# Patient Record
Sex: MALE | Race: WHITE | NOT HISPANIC OR LATINO | Employment: FULL TIME | URBAN - METROPOLITAN AREA
[De-identification: names, ages, dates, MRNs, and addresses within clinical notes are randomized per-mention and may not be internally consistent; named-entity substitution may affect disease eponyms.]

---

## 2019-04-01 ENCOUNTER — APPOINTMENT (OUTPATIENT)
Dept: LAB | Facility: HOSPITAL | Age: 59
End: 2019-04-01
Attending: SURGERY
Payer: COMMERCIAL

## 2019-04-01 ENCOUNTER — CONSULT (OUTPATIENT)
Dept: SURGERY | Facility: HOSPITAL | Age: 59
End: 2019-04-01
Payer: COMMERCIAL

## 2019-04-01 VITALS
HEART RATE: 98 BPM | WEIGHT: 275.2 LBS | HEIGHT: 72 IN | DIASTOLIC BLOOD PRESSURE: 97 MMHG | SYSTOLIC BLOOD PRESSURE: 138 MMHG | BODY MASS INDEX: 37.27 KG/M2 | TEMPERATURE: 99.4 F | RESPIRATION RATE: 16 BRPM

## 2019-04-01 DIAGNOSIS — D18.01 HEMANGIOMA OF SKIN: ICD-10-CM

## 2019-04-01 DIAGNOSIS — K42.9 UMBILICAL HERNIA WITHOUT OBSTRUCTION AND WITHOUT GANGRENE: ICD-10-CM

## 2019-04-01 DIAGNOSIS — R10.32 GROIN PAIN, LEFT: Primary | ICD-10-CM

## 2019-04-01 DIAGNOSIS — L21.9 SEBORRHEA: ICD-10-CM

## 2019-04-01 DIAGNOSIS — R10.32 GROIN PAIN, LEFT: ICD-10-CM

## 2019-04-01 DIAGNOSIS — D22.9 MULTIPLE PIGMENTED NEVI: ICD-10-CM

## 2019-04-01 DIAGNOSIS — E66.9 OBESITY (BMI 30-39.9): ICD-10-CM

## 2019-04-01 DIAGNOSIS — M62.08 RECTUS DIASTASIS: ICD-10-CM

## 2019-04-01 LAB
ANION GAP SERPL CALCULATED.3IONS-SCNC: 10 MMOL/L (ref 4–13)
BUN SERPL-MCNC: 10 MG/DL (ref 5–25)
CALCIUM SERPL-MCNC: 9.8 MG/DL (ref 8.3–10.1)
CHLORIDE SERPL-SCNC: 99 MMOL/L (ref 100–108)
CO2 SERPL-SCNC: 29 MMOL/L (ref 21–32)
CREAT SERPL-MCNC: 0.8 MG/DL (ref 0.6–1.3)
GFR SERPL CREATININE-BSD FRML MDRD: 99 ML/MIN/1.73SQ M
GLUCOSE SERPL-MCNC: 327 MG/DL (ref 65–140)
POTASSIUM SERPL-SCNC: 4 MMOL/L (ref 3.5–5.3)
SODIUM SERPL-SCNC: 138 MMOL/L (ref 136–145)

## 2019-04-01 PROCEDURE — 36415 COLL VENOUS BLD VENIPUNCTURE: CPT

## 2019-04-01 PROCEDURE — 99204 OFFICE O/P NEW MOD 45 MIN: CPT | Performed by: SURGERY

## 2019-04-01 PROCEDURE — 80048 BASIC METABOLIC PNL TOTAL CA: CPT

## 2019-04-01 RX ORDER — TRAVOPROST 0.004 %
DROPS OPHTHALMIC (EYE)
Refills: 4 | COMMUNITY
Start: 2019-01-07

## 2019-04-01 RX ORDER — VALSARTAN 160 MG/1
160 TABLET ORAL DAILY
Refills: 3 | COMMUNITY
Start: 2019-01-25

## 2019-04-06 ENCOUNTER — HOSPITAL ENCOUNTER (OUTPATIENT)
Dept: CT IMAGING | Facility: HOSPITAL | Age: 59
Discharge: HOME | End: 2019-04-06
Attending: SURGERY
Payer: COMMERCIAL

## 2019-04-06 DIAGNOSIS — R10.32 GROIN PAIN, LEFT: ICD-10-CM

## 2019-04-06 PROCEDURE — 72193 CT PELVIS W/DYE: CPT

## 2019-04-06 RX ADMIN — IOHEXOL 100 ML: 350 INJECTION, SOLUTION INTRAVENOUS at 07:54

## 2019-05-06 ENCOUNTER — OFFICE VISIT (OUTPATIENT)
Dept: SURGERY | Facility: HOSPITAL | Age: 59
End: 2019-05-06
Payer: COMMERCIAL

## 2019-05-06 ENCOUNTER — HOSPITAL ENCOUNTER (OUTPATIENT)
Dept: NON INVASIVE DIAGNOSTICS | Facility: HOSPITAL | Age: 59
Discharge: HOME | End: 2019-05-06
Attending: SURGERY
Payer: COMMERCIAL

## 2019-05-06 ENCOUNTER — LAB (OUTPATIENT)
Dept: LAB | Facility: HOSPITAL | Age: 59
End: 2019-05-06
Attending: SURGERY
Payer: COMMERCIAL

## 2019-05-06 ENCOUNTER — HOSPITAL ENCOUNTER (OUTPATIENT)
Dept: RADIOLOGY | Facility: HOSPITAL | Age: 59
Discharge: HOME | End: 2019-05-06
Attending: SURGERY
Payer: COMMERCIAL

## 2019-05-06 VITALS
HEART RATE: 92 BPM | SYSTOLIC BLOOD PRESSURE: 167 MMHG | HEIGHT: 72 IN | WEIGHT: 276.4 LBS | BODY MASS INDEX: 37.44 KG/M2 | TEMPERATURE: 99.1 F | RESPIRATION RATE: 16 BRPM | DIASTOLIC BLOOD PRESSURE: 113 MMHG

## 2019-05-06 DIAGNOSIS — K40.90 NON-RECURRENT UNILATERAL INGUINAL HERNIA WITHOUT OBSTRUCTION OR GANGRENE: Primary | ICD-10-CM

## 2019-05-06 DIAGNOSIS — K57.90 DIVERTICULOSIS: ICD-10-CM

## 2019-05-06 DIAGNOSIS — K40.90 NON-RECURRENT UNILATERAL INGUINAL HERNIA WITHOUT OBSTRUCTION OR GANGRENE: ICD-10-CM

## 2019-05-06 DIAGNOSIS — E66.01 OBESITIES, MORBID (HCC): ICD-10-CM

## 2019-05-06 DIAGNOSIS — K42.9 UMBILICAL HERNIA WITHOUT OBSTRUCTION OR GANGRENE: ICD-10-CM

## 2019-05-06 LAB
ANION GAP SERPL CALCULATED.3IONS-SCNC: 10 MMOL/L (ref 4–13)
ATRIAL RATE: 78 BPM
BASOPHILS # BLD AUTO: 0.03 THOUSANDS/ΜL (ref 0–0.1)
BASOPHILS NFR BLD AUTO: 0 % (ref 0–1)
BILIRUB UR QL STRIP: NEGATIVE
BUN SERPL-MCNC: 13 MG/DL (ref 5–25)
CALCIUM SERPL-MCNC: 9.3 MG/DL (ref 8.3–10.1)
CHLORIDE SERPL-SCNC: 100 MMOL/L (ref 100–108)
CLARITY UR: CLEAR
CO2 SERPL-SCNC: 27 MMOL/L (ref 21–32)
COLOR UR: YELLOW
CREAT SERPL-MCNC: 0.75 MG/DL (ref 0.6–1.3)
EOSINOPHIL # BLD AUTO: 0.26 THOUSAND/ΜL (ref 0–0.61)
EOSINOPHIL NFR BLD AUTO: 3 % (ref 0–6)
ERYTHROCYTE [DISTWIDTH] IN BLOOD BY AUTOMATED COUNT: 12.1 % (ref 11.6–15.1)
EST. AVERAGE GLUCOSE BLD GHB EST-MCNC: 209 MG/DL
GFR SERPL CREATININE-BSD FRML MDRD: 102 ML/MIN/1.73SQ M
GLUCOSE P FAST SERPL-MCNC: 230 MG/DL (ref 65–99)
GLUCOSE UR STRIP-MCNC: ABNORMAL MG/DL
HBA1C MFR BLD: 8.9 % (ref 4.2–6.3)
HCT VFR BLD AUTO: 43 % (ref 36.5–49.3)
HGB BLD-MCNC: 14.3 G/DL (ref 12–17)
HGB UR QL STRIP.AUTO: NEGATIVE
IMM GRANULOCYTES # BLD AUTO: 0.02 THOUSAND/UL (ref 0–0.2)
IMM GRANULOCYTES NFR BLD AUTO: 0 % (ref 0–2)
KETONES UR STRIP-MCNC: ABNORMAL MG/DL
LEUKOCYTE ESTERASE UR QL STRIP: NEGATIVE
LYMPHOCYTES # BLD AUTO: 3.07 THOUSANDS/ΜL (ref 0.6–4.47)
LYMPHOCYTES NFR BLD AUTO: 38 % (ref 14–44)
MCH RBC QN AUTO: 30.6 PG (ref 26.8–34.3)
MCHC RBC AUTO-ENTMCNC: 33.3 G/DL (ref 31.4–37.4)
MCV RBC AUTO: 92 FL (ref 82–98)
MONOCYTES # BLD AUTO: 0.66 THOUSAND/ΜL (ref 0.17–1.22)
MONOCYTES NFR BLD AUTO: 8 % (ref 4–12)
NEUTROPHILS # BLD AUTO: 3.99 THOUSANDS/ΜL (ref 1.85–7.62)
NEUTS SEG NFR BLD AUTO: 51 % (ref 43–75)
NITRITE UR QL STRIP: NEGATIVE
NRBC BLD AUTO-RTO: 0 /100 WBCS
P AXIS: 43 DEGREES
PH UR STRIP.AUTO: 5.5 [PH]
PLATELET # BLD AUTO: 225 THOUSANDS/UL (ref 149–390)
PMV BLD AUTO: 11.1 FL (ref 8.9–12.7)
POTASSIUM SERPL-SCNC: 4.4 MMOL/L (ref 3.5–5.3)
PR INTERVAL: 148 MS
PROT UR STRIP-MCNC: NEGATIVE MG/DL
QRS AXIS: 2 DEGREES
QRSD INTERVAL: 72 MS
QT INTERVAL: 382 MS
QTC INTERVAL: 435 MS
RBC # BLD AUTO: 4.67 MILLION/UL (ref 3.88–5.62)
SODIUM SERPL-SCNC: 137 MMOL/L (ref 136–145)
SP GR UR STRIP.AUTO: >=1.03 (ref 1–1.03)
T WAVE AXIS: 17 DEGREES
UROBILINOGEN UR QL STRIP.AUTO: 0.2 E.U./DL
VENTRICULAR RATE: 78 BPM
WBC # BLD AUTO: 8.03 THOUSAND/UL (ref 4.31–10.16)

## 2019-05-06 PROCEDURE — 83036 HEMOGLOBIN GLYCOSYLATED A1C: CPT

## 2019-05-06 PROCEDURE — 81003 URINALYSIS AUTO W/O SCOPE: CPT | Performed by: SURGERY

## 2019-05-06 PROCEDURE — 93010 ELECTROCARDIOGRAM REPORT: CPT | Performed by: INTERNAL MEDICINE

## 2019-05-06 PROCEDURE — 36415 COLL VENOUS BLD VENIPUNCTURE: CPT

## 2019-05-06 PROCEDURE — 71046 X-RAY EXAM CHEST 2 VIEWS: CPT

## 2019-05-06 PROCEDURE — 85025 COMPLETE CBC W/AUTO DIFF WBC: CPT

## 2019-05-06 PROCEDURE — 80048 BASIC METABOLIC PNL TOTAL CA: CPT

## 2019-05-06 PROCEDURE — 93005 ELECTROCARDIOGRAM TRACING: CPT

## 2019-05-06 PROCEDURE — 99214 OFFICE O/P EST MOD 30 MIN: CPT | Performed by: SURGERY

## 2019-05-12 RX ORDER — CEFAZOLIN SODIUM 2 G/50ML
2000 SOLUTION INTRAVENOUS ONCE
Status: CANCELLED | OUTPATIENT
Start: 2019-05-21 | End: 2019-05-21

## 2019-05-12 RX ORDER — SODIUM CHLORIDE, SODIUM LACTATE, POTASSIUM CHLORIDE, CALCIUM CHLORIDE 600; 310; 30; 20 MG/100ML; MG/100ML; MG/100ML; MG/100ML
125 INJECTION, SOLUTION INTRAVENOUS CONTINUOUS
Status: CANCELLED | OUTPATIENT
Start: 2019-05-21

## 2019-05-21 ENCOUNTER — HOSPITAL ENCOUNTER (OUTPATIENT)
Facility: HOSPITAL | Age: 59
Setting detail: OUTPATIENT SURGERY
Discharge: HOME | End: 2019-05-21
Attending: SURGERY | Admitting: SURGERY
Payer: COMMERCIAL

## 2019-05-21 ENCOUNTER — ANESTHESIA EVENT (OUTPATIENT)
Dept: PERIOP | Facility: HOSPITAL | Age: 59
End: 2019-05-21
Payer: COMMERCIAL

## 2019-05-21 ENCOUNTER — ANESTHESIA (OUTPATIENT)
Dept: PERIOP | Facility: HOSPITAL | Age: 59
End: 2019-05-21
Payer: COMMERCIAL

## 2019-05-21 VITALS
HEIGHT: 72 IN | OXYGEN SATURATION: 93 % | WEIGHT: 270.2 LBS | SYSTOLIC BLOOD PRESSURE: 139 MMHG | TEMPERATURE: 97.8 F | BODY MASS INDEX: 36.6 KG/M2 | DIASTOLIC BLOOD PRESSURE: 79 MMHG | HEART RATE: 97 BPM | RESPIRATION RATE: 16 BRPM

## 2019-05-21 DIAGNOSIS — Z98.890 S/P HERNIA REPAIR: Primary | ICD-10-CM

## 2019-05-21 DIAGNOSIS — Z87.19 S/P HERNIA REPAIR: Primary | ICD-10-CM

## 2019-05-21 DIAGNOSIS — K40.90 NON-RECURRENT UNILATERAL INGUINAL HERNIA WITHOUT OBSTRUCTION OR GANGRENE: ICD-10-CM

## 2019-05-21 PROBLEM — K42.9 UMBILICAL HERNIA WITHOUT OBSTRUCTION OR GANGRENE: Status: RESOLVED | Noted: 2019-05-06 | Resolved: 2019-05-21

## 2019-05-21 LAB
GLUCOSE SERPL-MCNC: 151 MG/DL (ref 65–140)
GLUCOSE SERPL-MCNC: 158 MG/DL (ref 65–140)

## 2019-05-21 PROCEDURE — 49650 LAP ING HERNIA REPAIR INIT: CPT | Performed by: SURGERY

## 2019-05-21 PROCEDURE — C1781 MESH (IMPLANTABLE): HCPCS | Performed by: SURGERY

## 2019-05-21 PROCEDURE — 82948 REAGENT STRIP/BLOOD GLUCOSE: CPT

## 2019-05-21 DEVICE — BARD 3DMAX MESH LEFT LARGE
Type: IMPLANTABLE DEVICE | Status: FUNCTIONAL
Brand: BARD 3DMAX MESH

## 2019-05-21 DEVICE — VENTRALEX ST HERNIA PATCH
Type: IMPLANTABLE DEVICE | Site: ABDOMEN | Status: FUNCTIONAL
Brand: VENTRALEX ST HERNIA PATCH

## 2019-05-21 RX ORDER — GLYCOPYRROLATE 0.2 MG/ML
INJECTION INTRAMUSCULAR; INTRAVENOUS AS NEEDED
Status: DISCONTINUED | OUTPATIENT
Start: 2019-05-21 | End: 2019-05-21 | Stop reason: SURG

## 2019-05-21 RX ORDER — CEFAZOLIN SODIUM 2 G/50ML
2000 SOLUTION INTRAVENOUS ONCE
Status: COMPLETED | OUTPATIENT
Start: 2019-05-21 | End: 2019-05-21

## 2019-05-21 RX ORDER — HYDROCODONE BITARTRATE AND ACETAMINOPHEN 5; 325 MG/1; MG/1
1-2 TABLET ORAL EVERY 4 HOURS PRN
Qty: 30 TABLET | Refills: 0 | Status: SHIPPED | OUTPATIENT
Start: 2019-05-21 | End: 2019-05-24

## 2019-05-21 RX ORDER — TAMSULOSIN HYDROCHLORIDE 0.4 MG/1
0.4 CAPSULE ORAL ONCE AS NEEDED
Status: DISCONTINUED | OUTPATIENT
Start: 2019-05-21 | End: 2019-05-21 | Stop reason: HOSPADM

## 2019-05-21 RX ORDER — ONDANSETRON 2 MG/ML
INJECTION INTRAMUSCULAR; INTRAVENOUS AS NEEDED
Status: DISCONTINUED | OUTPATIENT
Start: 2019-05-21 | End: 2019-05-21 | Stop reason: SURG

## 2019-05-21 RX ORDER — MAGNESIUM HYDROXIDE 1200 MG/15ML
LIQUID ORAL AS NEEDED
Status: DISCONTINUED | OUTPATIENT
Start: 2019-05-21 | End: 2019-05-21 | Stop reason: HOSPADM

## 2019-05-21 RX ORDER — PROPOFOL 10 MG/ML
INJECTION, EMULSION INTRAVENOUS AS NEEDED
Status: DISCONTINUED | OUTPATIENT
Start: 2019-05-21 | End: 2019-05-21 | Stop reason: SURG

## 2019-05-21 RX ORDER — HYDROCODONE BITARTRATE AND ACETAMINOPHEN 5; 325 MG/1; MG/1
2 TABLET ORAL EVERY 4 HOURS PRN
Status: DISCONTINUED | OUTPATIENT
Start: 2019-05-21 | End: 2019-05-21 | Stop reason: HOSPADM

## 2019-05-21 RX ORDER — FENTANYL CITRATE/PF 50 MCG/ML
25 SYRINGE (ML) INJECTION
Status: DISCONTINUED | OUTPATIENT
Start: 2019-05-21 | End: 2019-05-21 | Stop reason: HOSPADM

## 2019-05-21 RX ORDER — ONDANSETRON 2 MG/ML
4 INJECTION INTRAMUSCULAR; INTRAVENOUS EVERY 6 HOURS PRN
Status: DISCONTINUED | OUTPATIENT
Start: 2019-05-21 | End: 2019-05-21 | Stop reason: HOSPADM

## 2019-05-21 RX ORDER — CEFAZOLIN SODIUM 1 G/50ML
SOLUTION INTRAVENOUS AS NEEDED
Status: DISCONTINUED | OUTPATIENT
Start: 2019-05-21 | End: 2019-05-21 | Stop reason: SURG

## 2019-05-21 RX ORDER — GLIPIZIDE 5 MG/1
5 TABLET ORAL
COMMUNITY

## 2019-05-21 RX ORDER — HYDROCODONE BITARTRATE AND ACETAMINOPHEN 5; 325 MG/1; MG/1
1-2 TABLET ORAL EVERY 4 HOURS PRN
Qty: 30 TABLET | Refills: 0 | Status: SHIPPED | OUTPATIENT
Start: 2019-05-21 | End: 2019-05-21 | Stop reason: SDUPTHER

## 2019-05-21 RX ORDER — EPHEDRINE SULFATE 50 MG/ML
INJECTION INTRAVENOUS AS NEEDED
Status: DISCONTINUED | OUTPATIENT
Start: 2019-05-21 | End: 2019-05-21 | Stop reason: SURG

## 2019-05-21 RX ORDER — ACETAMINOPHEN 325 MG/1
650 TABLET ORAL EVERY 6 HOURS PRN
Status: DISCONTINUED | OUTPATIENT
Start: 2019-05-21 | End: 2019-05-21 | Stop reason: HOSPADM

## 2019-05-21 RX ORDER — NEOSTIGMINE METHYLSULFATE 1 MG/ML
INJECTION INTRAVENOUS AS NEEDED
Status: DISCONTINUED | OUTPATIENT
Start: 2019-05-21 | End: 2019-05-21 | Stop reason: SURG

## 2019-05-21 RX ORDER — KETOROLAC TROMETHAMINE 30 MG/ML
INJECTION, SOLUTION INTRAMUSCULAR; INTRAVENOUS AS NEEDED
Status: DISCONTINUED | OUTPATIENT
Start: 2019-05-21 | End: 2019-05-21 | Stop reason: SURG

## 2019-05-21 RX ORDER — HYDROMORPHONE HCL/PF 1 MG/ML
0.5 SYRINGE (ML) INJECTION
Status: DISCONTINUED | OUTPATIENT
Start: 2019-05-21 | End: 2019-05-21 | Stop reason: HOSPADM

## 2019-05-21 RX ORDER — FENTANYL CITRATE 50 UG/ML
INJECTION, SOLUTION INTRAMUSCULAR; INTRAVENOUS AS NEEDED
Status: DISCONTINUED | OUTPATIENT
Start: 2019-05-21 | End: 2019-05-21 | Stop reason: SURG

## 2019-05-21 RX ORDER — MIDAZOLAM HYDROCHLORIDE 1 MG/ML
INJECTION INTRAMUSCULAR; INTRAVENOUS AS NEEDED
Status: DISCONTINUED | OUTPATIENT
Start: 2019-05-21 | End: 2019-05-21 | Stop reason: SURG

## 2019-05-21 RX ORDER — ROCURONIUM BROMIDE 10 MG/ML
INJECTION, SOLUTION INTRAVENOUS AS NEEDED
Status: DISCONTINUED | OUTPATIENT
Start: 2019-05-21 | End: 2019-05-21 | Stop reason: SURG

## 2019-05-21 RX ORDER — SODIUM CHLORIDE, SODIUM LACTATE, POTASSIUM CHLORIDE, CALCIUM CHLORIDE 600; 310; 30; 20 MG/100ML; MG/100ML; MG/100ML; MG/100ML
125 INJECTION, SOLUTION INTRAVENOUS CONTINUOUS
Status: DISCONTINUED | OUTPATIENT
Start: 2019-05-21 | End: 2019-05-21 | Stop reason: HOSPADM

## 2019-05-21 RX ADMIN — FENTANYL CITRATE 50 MCG: 50 INJECTION, SOLUTION INTRAMUSCULAR; INTRAVENOUS at 12:04

## 2019-05-21 RX ADMIN — SODIUM CHLORIDE, SODIUM LACTATE, POTASSIUM CHLORIDE, AND CALCIUM CHLORIDE: .6; .31; .03; .02 INJECTION, SOLUTION INTRAVENOUS at 11:09

## 2019-05-21 RX ADMIN — PROPOFOL 300 MG: 10 INJECTION, EMULSION INTRAVENOUS at 10:36

## 2019-05-21 RX ADMIN — FENTANYL CITRATE 12.5 MCG: 50 INJECTION, SOLUTION INTRAMUSCULAR; INTRAVENOUS at 12:55

## 2019-05-21 RX ADMIN — EPHEDRINE SULFATE 10 MG: 50 INJECTION, SOLUTION INTRAVENOUS at 11:05

## 2019-05-21 RX ADMIN — ONDANSETRON 4 MG: 2 INJECTION INTRAMUSCULAR; INTRAVENOUS at 11:19

## 2019-05-21 RX ADMIN — GLYCOPYRROLATE 0.2 MG: 0.2 INJECTION, SOLUTION INTRAMUSCULAR; INTRAVENOUS at 10:27

## 2019-05-21 RX ADMIN — KETOROLAC TROMETHAMINE 30 MG: 30 INJECTION, SOLUTION INTRAMUSCULAR; INTRAVENOUS at 11:21

## 2019-05-21 RX ADMIN — FENTANYL CITRATE 100 MCG: 50 INJECTION, SOLUTION INTRAMUSCULAR; INTRAVENOUS at 10:31

## 2019-05-21 RX ADMIN — GLYCOPYRROLATE 0.4 MG: 0.2 INJECTION, SOLUTION INTRAMUSCULAR; INTRAVENOUS at 11:34

## 2019-05-21 RX ADMIN — HYDROCODONE BITARTRATE AND ACETAMINOPHEN 1 TABLET: 5; 325 TABLET ORAL at 13:29

## 2019-05-21 RX ADMIN — SODIUM CHLORIDE, SODIUM LACTATE, POTASSIUM CHLORIDE, AND CALCIUM CHLORIDE 125 ML/HR: .6; .31; .03; .02 INJECTION, SOLUTION INTRAVENOUS at 09:29

## 2019-05-21 RX ADMIN — CEFAZOLIN SODIUM 1000 MG: 1 SOLUTION INTRAVENOUS at 10:55

## 2019-05-21 RX ADMIN — CEFAZOLIN SODIUM 2000 MG: 2 SOLUTION INTRAVENOUS at 10:25

## 2019-05-21 RX ADMIN — NEOSTIGMINE METHYLSULFATE 3 MG: 1 INJECTION INTRAVENOUS at 11:33

## 2019-05-21 RX ADMIN — HYDROCODONE BITARTRATE AND ACETAMINOPHEN 1 TABLET: 5; 325 TABLET ORAL at 14:20

## 2019-05-21 RX ADMIN — FENTANYL CITRATE 50 MCG: 50 INJECTION, SOLUTION INTRAMUSCULAR; INTRAVENOUS at 11:10

## 2019-05-21 RX ADMIN — SODIUM CHLORIDE, SODIUM LACTATE, POTASSIUM CHLORIDE, AND CALCIUM CHLORIDE: .6; .31; .03; .02 INJECTION, SOLUTION INTRAVENOUS at 10:23

## 2019-05-21 RX ADMIN — MIDAZOLAM 2 MG: 1 INJECTION INTRAMUSCULAR; INTRAVENOUS at 10:27

## 2019-05-21 RX ADMIN — ROCURONIUM BROMIDE 50 MG: 10 INJECTION, SOLUTION INTRAVENOUS at 10:36

## 2019-05-23 ENCOUNTER — TELEPHONE (OUTPATIENT)
Dept: SURGERY | Facility: HOSPITAL | Age: 59
End: 2019-05-23

## 2019-06-03 ENCOUNTER — OFFICE VISIT (OUTPATIENT)
Dept: SURGERY | Facility: HOSPITAL | Age: 59
End: 2019-06-03

## 2019-06-03 VITALS — HEIGHT: 72 IN | WEIGHT: 278.4 LBS | BODY MASS INDEX: 37.71 KG/M2 | TEMPERATURE: 99.2 F

## 2019-06-03 DIAGNOSIS — Z09 POSTOP CHECK: Primary | ICD-10-CM

## 2019-06-03 PROCEDURE — 99024 POSTOP FOLLOW-UP VISIT: CPT | Performed by: SURGERY

## 2020-08-20 ENCOUNTER — APPOINTMENT (OUTPATIENT)
Dept: RADIOLOGY | Facility: CLINIC | Age: 60
End: 2020-08-20
Payer: COMMERCIAL

## 2020-08-20 VITALS
DIASTOLIC BLOOD PRESSURE: 80 MMHG | HEIGHT: 72 IN | BODY MASS INDEX: 36.3 KG/M2 | WEIGHT: 268 LBS | SYSTOLIC BLOOD PRESSURE: 143 MMHG | TEMPERATURE: 97.1 F

## 2020-08-20 DIAGNOSIS — M17.12 ARTHRITIS OF LEFT KNEE: Primary | ICD-10-CM

## 2020-08-20 DIAGNOSIS — M25.562 LEFT KNEE PAIN, UNSPECIFIED CHRONICITY: ICD-10-CM

## 2020-08-20 PROCEDURE — 99204 OFFICE O/P NEW MOD 45 MIN: CPT | Performed by: ORTHOPAEDIC SURGERY

## 2020-08-20 PROCEDURE — 73564 X-RAY EXAM KNEE 4 OR MORE: CPT

## 2020-08-20 PROCEDURE — 77073 BONE LENGTH STUDIES: CPT

## 2020-08-20 PROCEDURE — 20610 DRAIN/INJ JOINT/BURSA W/O US: CPT | Performed by: ORTHOPAEDIC SURGERY

## 2020-08-20 RX ORDER — METHYLPREDNISOLONE ACETATE 40 MG/ML
1 INJECTION, SUSPENSION INTRA-ARTICULAR; INTRALESIONAL; INTRAMUSCULAR; SOFT TISSUE
Status: COMPLETED | OUTPATIENT
Start: 2020-08-20 | End: 2020-08-20

## 2020-08-20 RX ORDER — TADALAFIL 20 MG/1
20 TABLET ORAL DAILY PRN
COMMUNITY

## 2020-08-20 RX ORDER — BUPIVACAINE HYDROCHLORIDE 2.5 MG/ML
4 INJECTION, SOLUTION INFILTRATION; PERINEURAL
Status: COMPLETED | OUTPATIENT
Start: 2020-08-20 | End: 2020-08-20

## 2020-08-20 RX ADMIN — BUPIVACAINE HYDROCHLORIDE 4 ML: 2.5 INJECTION, SOLUTION INFILTRATION; PERINEURAL at 08:55

## 2020-08-20 RX ADMIN — METHYLPREDNISOLONE ACETATE 1 ML: 40 INJECTION, SUSPENSION INTRA-ARTICULAR; INTRALESIONAL; INTRAMUSCULAR; SOFT TISSUE at 08:55

## 2020-08-20 NOTE — PROGRESS NOTES
Orthopaedic Surgery Note    CC: Left Knee Pain      HPI:  Vidhya Suárez is a 63 y o male with a history of left knee pain  He reports that this has been going on for at least 10 years  Had [facility]SI with [facility] (cannot recall dr name) in 2009 and this did provide some relief  He reports taht his pain has gotten worse over the past few months and especially bad in the past 2-3 weeks  Describes the pain as aching, sharp, throbbing  Moderate severity  Numericaly it is a 7 of 10  Worsening over time  Better with sitting still, worse with knee flexion  It is interfering with work (), hobbies, ADLs  It is wakin ghim from sleep  He has tried NSAIDs, no tylenol, surgery, PT, or recent injections  ALLERGIES:  Allergies   Allergen Reactions    Penicillins Hives and Swelling     OF HANDS    Sulfa Antibiotics Other (See Comments)     UNKNOWN-occurrence as child    Adhesive [Medical Tape]      BLISTERS       CURRENT MEDICATIONS:  Current Outpatient Medications   Medication Sig Dispense Refill    glipiZIDE (GLUCOTROL) 5 mg tablet Take 5 mg by mouth 2 (two) times a day before meals      metFORMIN (GLUCOPHAGE) 500 mg tablet 1,000 mg 2 (two) times a day  5    tadalafil (CIALIS) 20 MG tablet Take 20 mg by mouth daily as needed for erectile dysfunction      TRAVATAN Z 0 004 % ophthalmic solution PUT 1 DROP INTO BOTH EYES AT BEDTIME  4    valsartan (DIOVAN) 160 mg tablet Take 160 mg by mouth daily  3     No current facility-administered medications for this visit          PAST MEDICAL HISTORY  Past Medical History:   Diagnosis Date    Diabetes mellitus ([facility] [address] )     Glaucoma     Gout     Headache, migraine     Hypertension     Impaired fasting glucose     Osteoarthritis     Umbilical hernia        SURGICAL HISTORY  Past Surgical History:   Procedure Laterality Date    COLONOSCOPY      ID LAP,INGUINAL HERNIA REPR,INITIAL Left 5/21/2019    Procedure: REPAIR HERNIA   LEFT INGUINAL, LAPAROSCOPIC;  Surgeon: Stephanie Chiu MD;  Location:  MAIN OR;  Service: General    OK REPAIR UMBILICAL PKFU,8+R/U,MRJDR N/A 5/21/2019    Procedure: REPAIR HERNIA UMBILICAL OPEN WITH MESH;  Surgeon: Stephanie Chiu MD;  Location:  MAIN OR;  Service: General       FAMILY HISTORY  Family History   Problem Relation Age of Onset    Diabetes type II Mother     Diabetes type II Father     COPD Father     Stroke Father        SOCIAL HISTORY  Social History     Socioeconomic History    Marital status: /Civil Union     Spouse name: Not on file    Number of children: Not on file    Years of education: Not on file    Highest education level: Not on file   Occupational History    Not on file   Social Needs    Financial resource strain: Not on file    Food insecurity     Worry: Not on file     Inability: Not on file    Transportation needs     Medical: Not on file     Non-medical: Not on file   Tobacco Use    Smoking status: Never Smoker    Smokeless tobacco: Never Used    Tobacco comment: previous cigar smoking   Substance and Sexual Activity    Alcohol use: Yes     Frequency: 2-3 times a week     Drinks per session: 1 or 2     Binge frequency: Never    Drug use: Never    Sexual activity: Not on file   Lifestyle    Physical activity     Days per week: Not on file     Minutes per session: Not on file    Stress: Not on file   Relationships    Social connections     Talks on phone: Not on file     Gets together: Not on file     Attends Jainism service: Not on file     Active member of club or organization: Not on file     Attends meetings of clubs or organizations: Not on file     Relationship status: Not on file    Intimate partner violence     Fear of current or ex partner: Not on file     Emotionally abused: Not on file     Physically abused: Not on file     Forced sexual activity: Not on file   Other Topics Concern    Not on file   Social History Narrative    Not on file Review of Systems   Metal Allergy: no  History of MRSA: no  History of DVT or PE: no  Active dental issues: no  Anesthesia complications: no    Patient indicated positive for vision problems, glaucoma, back pain, diabets  Otherwise negative except per above and HPI  Physical Exam    Vitals  Vitals:    08/20/20 0800   BP: 143/80   Temp: (!) 97 1 °F (36 2 °C)       BMI  Body mass index is 36 35 kg/m²  GENERAL: No acute distress  Alert and oriented  Well nourished and well hydrated  Appears stated age  HEENT : Normocephalic, atraumatic  Extraocular movements intact  Mask in place  NECK: Supple, trachea midline  LUNGS: Adequate and symmetric respiratory effort  No intercostal retractions or accessory muscle use  HEART: Extremities warm and perfused  ABDOMEN: Nondistended  SKIN: Warm and dry, no rash  Left  Knee   Inspection/Appearance:       Swelling: No      Patella is midline  Alignment:  Knee is in neutral  Palpation - Soft Tissue: normal without effusion  ROM:       Extension - 0          Flexion - 120      extensor lag: no    Stability:  demonstrates no varus, valgus, anterior drawer or posterior drawer  Patella: stable, tracks normally  Sensation Intact to Light Touch in Sural, Saphenous, Tibial, Superficial Peroneal, and Deep Peroneal Nerve Distribution  Motor function 5 out of 5 strength in Tibialis Anterior, Gastrocnemius, Soleus, Extensor Hallucis Longus, and Flexor Hallucis Longus Muscles  Extremity Warm and Well Perfused  Brisk Capillary Refill in Toes           Imaging  A) Imaging modality available  Radiographs: yes  MRI scan: no  CT scan: no    B) Imaging findings  Subchondral cysts: no  Subchondral sclerosis: yes  Periarticular osteophytes: yes  Joint subluxation: no  Joint space narrowing: yes  Bone-on-bone articulations: no  Avascular necrosis: no      Assessment and Plan  Left  Knee Arthritis    Knee Pain / Arthritis Treatment Recommendations    Strengthening Exercises  10 repetitions each leg Monday, Wednesday, Friday OR Tuesday, Thursday, Saturday  Increase 10 repetitions per week  1  Straight leg raises (SLR)  2  VMO Modification SLR  (Rotate hip out externally) Do if SLR becomes easy    Exercise  5 minutes per day Monday, Wednesday, Friday OR Tuesday, Thursday, Saturday  Increase 5 minutes per day per week  May use bike (non-impact) or walk (impact) or swim or alternate  Goal is to get up to at least 30 minutes per day  1  Bicycle  2  Walking    Weight loss   Goal to lose 1 pound per week  Portion control, limit sweets, limit carbs    Medications  Anti-inflammatories (NSAIDs)  1  Ibuprofen (Motrin or Advil) 600 mg (3 pills) with food 3 times a day for 3  7 days  OR  2  Naprosyn (Aleve) 1  2 pills twice a day with food for 3  7 days  Stop if you develop upset stomach or bleeding occurs  We discussed that scheduled NSAIDs for greater than 1 week should be discussed with PCP to monitor for potential side effects  Pain reliever  1  Acetaminophen (Tylenol) 2 pills (regular or extra-strength) 3 times a day for 3  7 days    Taken together (Acetaminophen with one of the NSAIDs (ibuprofen OR naprosyn)), the combination may work better than either one alone for more acute pain    Other  Braces, wraps, ice, heat, topical ointments may all be used/tried if they help pain/symptoms      Recommend PT and CSI, followup 3 months  I did review the above with the patient      Large joint arthrocentesis: L knee  Date/Time: 8/20/2020 8:55 AM  Consent given by: patient  Site marked: site marked  Timeout: Immediately prior to procedure a time out was called to verify the correct patient, procedure, equipment, support staff and site/side marked as required   Supporting Documentation  Indications: pain   Procedure Details  Location: knee - L knee  Needle size: 20 G  Approach: anterior  Medications administered: 1 mL methylPREDNISolone acetate 40 mg/mL; 4 mL bupivacaine 0 25 %    Patient tolerance: patient tolerated the procedure well with no immediate complications  Dressing:  Sterile dressing applied                  Marco Aaron MD  Adult Reconstruction Surgery  Department [place] [id number]  8:52 AM

## 2020-08-27 ENCOUNTER — EVALUATION (OUTPATIENT)
Dept: PHYSICAL THERAPY | Facility: CLINIC | Age: 60
End: 2020-08-27
Payer: COMMERCIAL

## 2020-08-27 DIAGNOSIS — M17.12 ARTHRITIS OF KNEE, LEFT: ICD-10-CM

## 2020-08-27 DIAGNOSIS — M17.12 ARTHRITIS OF LEFT KNEE: ICD-10-CM

## 2020-08-27 DIAGNOSIS — M25.562 LEFT KNEE PAIN, UNSPECIFIED CHRONICITY: Primary | ICD-10-CM

## 2020-08-27 PROCEDURE — 97162 PT EVAL MOD COMPLEX 30 MIN: CPT | Performed by: PHYSICAL THERAPIST

## 2020-08-27 PROCEDURE — 97110 THERAPEUTIC EXERCISES: CPT | Performed by: PHYSICAL THERAPIST

## 2020-08-27 NOTE — PROGRESS NOTES
PT Evaluation     Today's date: 2020  Patient name: Miryam Burr  : 1960  MRN: [id number]  Referring provider: Gabino Smith MD  Dx: No diagnosis found  Assessment  Assessment details: Miryam Burr is a 63 y o  male presenting to outpatient physical therapy with chief complaints of (L)>(R) knee pan  Patient describes chronic knee pain for many years with recent increase in activity and increased pain  Patient displays with abnormal gait, no restrictions with (B) knee AROM, (-) instability testing, (+) (L) meniscus testing, balance impairments, and functional restrictions  Patient's symptoms are multifactorial in nature with a primary movement diagnosis of poor motor control resulting in pathoanatomical signs and symptoms consistent with (L) knee arthritis resulting in limitations in his ability to walk long distances and negotiate steps without increased pain  No further referral appears necessary at this time based upon examination results  Please contact me if you have any questions  Thank you for the opportunity to share in the care of this patient  Impairments: abnormal gait, abnormal muscle tone, abnormal or restricted ROM, abnormal movement, activity intolerance, impaired balance, impaired physical strength, lacks appropriate home exercise program, pain with function and poor body mechanics    Symptom irritability: moderateUnderstanding of Dx/Px/POC: good   Prognosis: good  Prognosis details: Positive prognostic indicators include positive attitude toward recovery, motivated to improve, high self-efficacy, good understanding of condition  Negative prognostic indicators include chronicity of symptoms  Goals  STG to be met in 2 weeks (9/10/20):  - Increase (B) Hip and Knee strength by 1/2 MMT grade  - Improve SL balance to 20 seconds  - I with HEP  LTG to be met in 4 weeks (20):  - Increase (B) Hip and Knee strength to 5/5 all planes    - Improve SL balance to 30 seconds  - I with updated HEP   - Patient will be able to return to walking greater than 1 mile  - Patient will be able to negotiate a full flight of steps without increased pain  Plan  Plan details: Prognosis above is given PT services 2x/week tapering to 1x/week over the next 4 weeks and home program adherence  Patient would benefit from: skilled physical therapy  Planned modality interventions: cryotherapy and thermotherapy: hydrocollator packs  Planned therapy interventions: joint mobilization, manual therapy, neuromuscular re-education, patient education, strengthening, stretching, therapeutic activities, therapeutic exercise, home exercise program, body mechanics training, activity modification, functional ROM exercises, gait training and balance  Plan of Care beginning date: 2020  Plan of Care expiration date: 2020  Treatment plan discussed with: patient and PTA        Subjective Evaluation    History of Present Illness  Mechanism of injury: Patient reports chronic knee pain for many years  He reports being treated with cortisone injections  He notes about a month ago he had increase in (L) knee pain and difficulty weightbearing  He notes recently changing his activity - now driving a lot and using steps daily  He also notes having a lawn that he has to push mow  He saw an orthopedic specialist - x-rays and cortisone injection were performed  PT was recommended  He also notes having a chiropractic treatment - laser treatment which was helpful        Red Flag Screen:  Patient denies recent fever, changes in bowel and bladder function, nausea and vomiting, weakness, unexplained weight loss, tingling, numbness, pain with coughing, or traumatic VALENTIN   Patient reports overuse injury       Greatest concern: not being able to walk or get out of the car without limping    Quality of life: good    Pain  Current pain rating: 3  At best pain ratin  At worst pain ratin  Location: (L) > (R) Knee  Quality: throbbing, sharp and dull ache    Social Support  Steps to enter house: yes  Stairs in house: yes   Lives in: multiple-level home  Lives with: spouse    Employment status: working ([employer] officer )    Diagnostic Tests  X-ray: abnormal  Treatments  Previous treatment: injection treatment and chiropractic  Patient Goals  Patient goal: Return to getting in and out of the car without difficulty, return to walking a mile without increased pain, negotiate steps without difficulty        Objective     Static Posture   General Observations  Symmetrical weight bearing  Palpation     Additional Palpation Details  TTP (B) medial joint line, pes anserine region     Active Range of Motion   Left Knee   Flexion: 135 degrees   Extension: 0 degrees     Right Knee   Flexion: 130 degrees   Extension: 0 degrees     Additional Active Range of Motion Details  Pain with flexion OP (B); pain with extension OP (L)    Strength/Myotome Testing     Left Hip   Planes of Motion   Extension: 4+  Abduction: 4    Right Hip   Planes of Motion   Extension: 4+  Abduction: 4+    Left Knee   Flexion: 5  Extension: 4+    Right Knee   Flexion: 5  Extension: 4+    Tests     Left Knee   Positive medial Nancy  Negative anterior drawer, lateral Nancy, posterior drawer, valgus stress test at 0 degrees, valgus stress test at 30 degrees, varus stress test at 0 degrees and varus stress test at 30 degrees  Right Knee   Negative anterior drawer, lateral Nancy, medial Nancy, posterior drawer, valgus stress test at 0 degrees, valgus stress test at 30 degrees, varus stress test at 0 degrees and varus stress test at 30 degrees  Additional Tests Details  Increased (L) knee pain with valgus stress - no laxity noted     Ambulation     Observational Gait   Gait: antalgic   Decreased walking speed, left stance time, left step length and right step length     Left foot contact pattern: foot flat  Right foot contact pattern: foot flat  Base of support: increased    Functional Assessment        Comments  Sit to stand: equal WB - no UE use   Squat: quad dominant - increased pain  FWD Lunge: off-balance (B) - limited depth (L) >(R)  Step Up: increased difficulty on (L)  SL Balance: (R) > 30 sec - ankle strategy; (L) 10 sec - hip strategy               Daily Treatment Diary     DX: (L) Knee OA  EPOC: 9/24/20  Precautions: allergic to adhesives  CO-MORBIDITIES: DM II, HTN    Stage: subacute on chronic  Stability of Symptoms: stable - unchanging  Symptom Irritability Level: moderate  Primary Movement Diagnosis: poor motor control   Goal(s):   STG to be met in 2 weeks (9/10/20):  - Increase (B) Hip and Knee strength by 1/2 MMT grade  - Improve SL balance to 20 seconds  - I with HEP  LTG to be met in 4 weeks (9/24/20):  - Increase (B) Hip and Knee strength to 5/5 all planes  - Improve SL balance to 30 seconds  - I with updated HEP   - Patient will be able to return to walking greater than 1 mile    Greatest Concern: not being able to walk or get out of the car without limping  Current Activity Recommendations: added HEP (8/27)  Current Educational Needs: Progressions      Manual                                                           Exercise Diary  HEP         Therapeutic Exercise           Recumbent Bike                    SLR 8/27         S/L Hip ABD 8/27         PHE 8/27         Bridge 8/27                   LAQ          Leg Press                              Neuromuscular Reeducation          SL Balance                    TB Counter Balance                    FWD Mini Lunge          LAT Mini Lunge          Mini Squat                    FWD Step Up          LAT Step Up                    Therapeutic Activity                              Gait Training                        Modalities

## 2020-09-01 ENCOUNTER — OFFICE VISIT (OUTPATIENT)
Dept: PHYSICAL THERAPY | Facility: CLINIC | Age: 60
End: 2020-09-01
Payer: COMMERCIAL

## 2020-09-01 DIAGNOSIS — M25.562 LEFT KNEE PAIN, UNSPECIFIED CHRONICITY: Primary | ICD-10-CM

## 2020-09-01 DIAGNOSIS — M17.12 ARTHRITIS OF KNEE, LEFT: ICD-10-CM

## 2020-09-01 PROCEDURE — 97112 NEUROMUSCULAR REEDUCATION: CPT

## 2020-09-01 PROCEDURE — 97110 THERAPEUTIC EXERCISES: CPT

## 2020-09-01 NOTE — PROGRESS NOTES
Daily Note     Today's date: 2020  Patient name: Silvina Marin  : 1960  MRN: [id number]  Referring provider: Kindra Rosenthal MD  Dx:   Encounter Diagnosis     ICD-10-CM    1  Left knee pain, unspecified chronicity  M25 562    2  Arthritis of knee, left  M17 12                   Subjective: Pt  Reports good tolerance to his IE  He reports he hasn't been able to get into a consistent routine with his HEP yet but is working on improving that and has done them some  Today he is feeling pretty good because his day is just starting, compared to after a long day of work  He reported biggest challenge with his knee discomfort at work is long car drives and then getting moving after that  Objective: See treatment diary below      Assessment: Initiated PT POC today  Pt  Has good exercise recall of HEP  He was able to tolerate TE's given today with minimal increase in discomfort t/o but reported fatigue and some soreness post  Needs some VC's to correct form t/o  Tolerated treatment well  Patient would benefit from continued PT      Plan: Continue per plan of care  Progress treatment as tolerated  Monitor response to initial treatment  Daily Treatment Diary     DX: (L) Knee OA  EPOC: 20  Precautions: allergic to adhesives  CO-MORBIDITIES: DM II, HTN    Stage: subacute on chronic  Stability of Symptoms: stable - unchanging  Symptom Irritability Level: moderate  Primary Movement Diagnosis: poor motor control   Goal(s):   STG to be met in 2 weeks (9/10/20):  - Increase (B) Hip and Knee strength by 1/2 MMT grade  - Improve SL balance to 20 seconds  - I with HEP  LTG to be met in 4 weeks (20):  - Increase (B) Hip and Knee strength to 5/5 all planes  - Improve SL balance to 30 seconds  - I with updated HEP   - Patient will be able to return to walking greater than 1 mile    Greatest Concern: not being able to walk or get out of the car without limping  Current Activity Recommendations: added HEP (8/27)  Current Educational Needs: Progressions      Manual                                                           Exercise Diary  HEP 9/1        Therapeutic Exercise           Recumbent Bike  5 min                  Standing Gastroc stretch  NV                  SLR 8/27 5"x10 ea        S/L Hip ABD 8/27 5"x10 ea        PHE 8/27 5"x10 ea        Bridge 8/27 5"x10                   LAQ  5"x10 ea        Leg Press  85# x10  105# x10                            Neuromuscular Reeducation          SL Balance  20"x3 ea                  TB Counter Balance  nv                  FWD Mini Lunge  10 ea        LAT Mini Lunge  10 ea        Mini Squat  10 ea                  FWD Step Up  6"x10 ea        LAT Step Up  6"x10 ea                  Therapeutic Activity                              Gait Training                        Modalities

## 2020-09-03 ENCOUNTER — OFFICE VISIT (OUTPATIENT)
Dept: PHYSICAL THERAPY | Facility: CLINIC | Age: 60
End: 2020-09-03
Payer: COMMERCIAL

## 2020-09-03 DIAGNOSIS — M25.562 LEFT KNEE PAIN, UNSPECIFIED CHRONICITY: Primary | ICD-10-CM

## 2020-09-03 DIAGNOSIS — M17.12 ARTHRITIS OF KNEE, LEFT: ICD-10-CM

## 2020-09-03 PROCEDURE — 97112 NEUROMUSCULAR REEDUCATION: CPT

## 2020-09-03 PROCEDURE — 97110 THERAPEUTIC EXERCISES: CPT

## 2020-09-03 NOTE — PROGRESS NOTES
Daily Note     Today's date: 9/3/2020  Patient name: Jeff Radford  : 1960  MRN: [id number]  Referring provider: Kaya Uriostegui MD  Dx:   Encounter Diagnosis     ICD-10-CM    1  Left knee pain, unspecified chronicity  M25 562    2  Arthritis of knee, left  M17 12                   Subjective: Pt  Reports good tolerance to his first visit with some mild muscle soreness from the new exercises  He reports his HEP is going well so far  Objective: See treatment diary below      Assessment: Pt  Continues to progress so far in therapy  He was able to tolerate added resistance to TE's today with no adverse effects  He fatigues quickly and would benefit from continued strengthening  Needs cotninued VC's at times to correct form t/o  Tolerated overall treatment well  Patient would benefit from continued PT      Plan: Continue per plan of care  Progress treatment as tolerated  Monitor response to progression of treatment  Daily Treatment Diary     DX: (L) Knee OA  EPOC: 20  Precautions: allergic to adhesives  CO-MORBIDITIES: DM II, HTN    Stage: subacute on chronic  Stability of Symptoms: stable - unchanging  Symptom Irritability Level: moderate  Primary Movement Diagnosis: poor motor control   Goal(s):   STG to be met in 2 weeks (9/10/20):  - Increase (B) Hip and Knee strength by 1/2 MMT grade  - Improve SL balance to 20 seconds  - I with HEP  LTG to be met in 4 weeks (20):  - Increase (B) Hip and Knee strength to 5/5 all planes  - Improve SL balance to 30 seconds  - I with updated HEP   - Patient will be able to return to walking greater than 1 mile    Greatest Concern: not being able to walk or get out of the car without limping  Current Activity Recommendations: added HEP () updated HEP (9/3)  Current Educational Needs: Progressions      Manual                                                           Exercise Diary  HEP 9/1 9/3       Therapeutic Exercise           Recumbent Bike 5 min 6 min                 Standing Gastroc stretch  NV 15"x5 ea                 SLR 8/27 5"x10 ea 1 5# x10 ea       S/L Hip ABD 8/27 5"x10 ea 1 5# x10 ea       PHE 8/27 5"x10 ea 1 5# x10 ea       Bridge 8/27 5"x10  5"x10                 LAQ  5"x10 ea 1 5#   5"x10 ea       Leg Press  85# x10  105# x10 105# x10  115#  x10                           Neuromuscular Reeducation          SL Balance  20"x3 ea 20"x3 ea                 TB Counter Balance  nv OTB  10x ea                 FWD Mini Lunge 9/3 10 ea 10 ea       LAT Mini Lunge 9/3 10 ea 10 ea       Mini Squat  5"x10 5"x10                 FWD Step Up  6"x10 ea 6"x10 ea       LAT Step Up  6"x10 ea 6"x10 ea                 Therapeutic Activity                              Gait Training                        Modalities

## 2020-09-08 ENCOUNTER — OFFICE VISIT (OUTPATIENT)
Dept: PHYSICAL THERAPY | Facility: CLINIC | Age: 60
End: 2020-09-08
Payer: COMMERCIAL

## 2020-09-08 DIAGNOSIS — M17.12 ARTHRITIS OF KNEE, LEFT: ICD-10-CM

## 2020-09-08 DIAGNOSIS — M25.562 LEFT KNEE PAIN, UNSPECIFIED CHRONICITY: Primary | ICD-10-CM

## 2020-09-08 PROCEDURE — 97112 NEUROMUSCULAR REEDUCATION: CPT

## 2020-09-08 PROCEDURE — 97110 THERAPEUTIC EXERCISES: CPT

## 2020-09-08 NOTE — PROGRESS NOTES
Daily Note     Today's date: 2020  Patient name: Paz Elias  : 1960  MRN: [id number]  Referring provider: Yaritza Mcginnis MD  Dx:   Encounter Diagnosis     ICD-10-CM    1  Left knee pain, unspecified chronicity  M25 562    2  Arthritis of knee, left  M17 12                   Subjective: Pt  Reports good tolerance to progressions LV  He reports he has seen a lot of improvement so far since starting therapy  Most noticeable with going up/down steps as he used almost craw up the steps before starting therapy and this weekend he carried laundry up the steps without holding on to rail  He reports HEP is going great  Objective: See treatment diary below      Assessment: Pt  Continues to progress well in therapy  Continues to tolerate progression with increased resistance and no adverse effects  He has good exercises recall and his form has improved since starting  Still has heavy drop off with step downs and slight discomfort but large improvement since before starting therapy  Tolerated overall treatment well  Patient would benefit from continued PT      Plan: Continue per plan of care  Progress treatment as tolerated  Plan to increase reps NV       Daily Treatment Diary     DX: (L) Knee OA  EPOC: 20  Precautions: allergic to adhesives  CO-MORBIDITIES: DM II, HTN    Stage: subacute on chronic  Stability of Symptoms: stable - unchanging  Symptom Irritability Level: moderate  Primary Movement Diagnosis: poor motor control   Goal(s):   STG to be met in 2 weeks (9/10/20):  - Increase (B) Hip and Knee strength by 1/2 MMT grade  - Improve SL balance to 20 seconds  - I with HEP  LTG to be met in 4 weeks (20):  - Increase (B) Hip and Knee strength to 5/5 all planes  - Improve SL balance to 30 seconds  - I with updated HEP   - Patient will be able to return to walking greater than 1 mile    Greatest Concern: not being able to walk or get out of the car without limping  Current Activity Recommendations: added HEP (8/27) updated HEP (9/3)  Current Educational Needs: Progressions      Manual                                                           Exercise Diary  HEP 9/1 9/3 9/8      Therapeutic Exercise           Recumbent Bike  5 min 6 min 6 min                Standing Gastroc stretch  NV 15"x5 ea 30"x3 ea                SLR 8/27 5"x10 ea 1 5# x10 ea 2#  x10 ea      S/L Hip ABD 8/27 5"x10 ea 1 5# x10 ea 2#  x10 ea      PHE 8/27 5"x10 ea 1 5# x10 ea 2#  x10 ea      Bridge 8/27 5"x10  5"x10 5"x10                LAQ  5"x10 ea 1 5#   5"x10 ea 2#  5" x10 ea      Leg Press  85# x10  105# x10 105# x10  115#  x10 115#  x10  125#  x10                          Neuromuscular Reeducation          SL Balance  20"x3 ea 20"x3 ea 20"x3                TB Counter Balance  nv OTB  10x ea OTB  10x ea                FWD Mini Lunge 9/3 10 ea 10 ea 10 ea      LAT Mini Lunge 9/3 10 ea 10 ea 10 ea      Mini Squat  5"x10 5"x10 5"x10                FWD Step Up  6"x10 ea 6"x10 ea 8"x10 ea      LAT Step Up  6"x10 ea 6"x10 ea 8"x10 ea                          Therapeutic Activity                              Gait Training                        Modalities

## 2020-09-10 ENCOUNTER — OFFICE VISIT (OUTPATIENT)
Dept: PHYSICAL THERAPY | Facility: CLINIC | Age: 60
End: 2020-09-10
Payer: COMMERCIAL

## 2020-09-10 DIAGNOSIS — M17.12 ARTHRITIS OF KNEE, LEFT: Primary | ICD-10-CM

## 2020-09-10 DIAGNOSIS — M25.562 LEFT KNEE PAIN, UNSPECIFIED CHRONICITY: ICD-10-CM

## 2020-09-10 PROCEDURE — 97112 NEUROMUSCULAR REEDUCATION: CPT

## 2020-09-10 PROCEDURE — 97110 THERAPEUTIC EXERCISES: CPT

## 2020-09-10 NOTE — PROGRESS NOTES
Daily Note     Today's date: 9/10/2020  Patient name: Stanley Lu  : 1960  MRN: [id number]  Referring provider: Nayeli Rascon MD  Dx:   Encounter Diagnosis     ICD-10-CM    1  Arthritis of knee, left  M17 12    2  Left knee pain, unspecified chronicity  M25 562                   Subjective: Pt  Reports good tolerance to progressions LV  He reports he has seen notable improvement when getting out of his car- not as stiff and hard to get moving after long car rides to his job    Objective: See treatment diary below      Assessment: Pt  Continues to progress well in therapy  Continues to tolerate progression with increased resistance and no adverse effects  Continued to work on strength and endurance with increased reps and resistance today  Tolerated overall treatment well  Exercise Form without correction is improving  Patient would benefit from continued PT      Plan: Continue per plan of care  Progress treatment as tolerated  Daily Treatment Diary     DX: (L) Knee OA  EPOC: 20  Precautions: allergic to adhesives  CO-MORBIDITIES: DM II, HTN    Stage: subacute on chronic  Stability of Symptoms: stable - unchanging  Symptom Irritability Level: moderate  Primary Movement Diagnosis: poor motor control   Goal(s):   STG to be met in 2 weeks (9/10/20):  - Increase (B) Hip and Knee strength by 1/2 MMT grade  - Improve SL balance to 20 seconds  - I with HEP  LTG to be met in 4 weeks (20):  - Increase (B) Hip and Knee strength to 5/5 all planes  - Improve SL balance to 30 seconds  - I with updated HEP   - Patient will be able to return to walking greater than 1 mile    Greatest Concern: not being able to walk or get out of the car without limping  Current Activity Recommendations: added HEP () updated HEP (9/3)  Current Educational Needs: Progressions      Manual                                                           Exercise Diary  HEP 9/1 9/3 9/8 9/10     Therapeutic Exercise Recumbent Bike  5 min 6 min 6 min 6 min               Standing Gastroc stretch  NV 15"x5 ea 30"x3 ea 30"x3 ea               SLR 8/27 5"x10 ea 1 5# x10 ea 2#  x10 ea 2 5#  10x2 ea     S/L Hip ABD 8/27 5"x10 ea 1 5# x10 ea 2#  x10 ea 2 5#  10x2 ea     PHE 8/27 5"x10 ea 1 5# x10 ea 2#  x10 ea 2 5#  10x2 ea     Bridge 8/27 5"x10  5"x10 5"x10 5"x20               LAQ  5"x10 ea 1 5#   5"x10 ea 2#  5" x10 ea 2 5#  10x2 ea     Leg Press  85# x10  105# x10 105# x10  115#  x10 115#  x10  125#  x10 125#  x10  135#  x10                         Neuromuscular Reeducation          SL Balance  20"x3 ea 20"x3 ea 20"x3 20"x5 ea               TB Counter Balance  nv OTB  10x ea OTB  10x ea OTB  10x ea               FWD Mini Lunge 9/3 10 ea 10 ea 10 ea 10 ea     LAT Mini Lunge 9/3 10 ea 10 ea 10 ea 10 ea     Mini Squat  5"x10 5"x10 5"x10 Foam  5"x10               FWD Step Up  6"x10 ea 6"x10 ea 8"x10 ea      LAT Step Up  6"x10 ea 6"x10 ea 8"x10 ea      Fwd tap downs     4"x10 ea     Lat tap downs     4"x10 ea               Therapeutic Activity                              Gait Training                        Modalities

## 2020-09-15 ENCOUNTER — OFFICE VISIT (OUTPATIENT)
Dept: PHYSICAL THERAPY | Facility: CLINIC | Age: 60
End: 2020-09-15
Payer: COMMERCIAL

## 2020-09-15 DIAGNOSIS — M25.562 LEFT KNEE PAIN, UNSPECIFIED CHRONICITY: ICD-10-CM

## 2020-09-15 DIAGNOSIS — M17.12 ARTHRITIS OF KNEE, LEFT: Primary | ICD-10-CM

## 2020-09-15 PROCEDURE — 97112 NEUROMUSCULAR REEDUCATION: CPT

## 2020-09-15 PROCEDURE — 97110 THERAPEUTIC EXERCISES: CPT

## 2020-09-15 NOTE — PROGRESS NOTES
Daily Note     Today's date: 9/15/2020  Patient name: Heide Garcia  : 1960  MRN: [id number]  Referring provider: Karis Walker MD  Dx:   Encounter Diagnosis     ICD-10-CM    1  Arthritis of knee, left  M17 12    2  Left knee pain, unspecified chronicity  M25 562                   Subjective: Pt  Reports good tolerance to progressions LV  He reports he has seen notable improvement when getting out of his car- not as stiff and hard to get moving after long car rides to his job    Objective: See treatment diary below      Assessment: Pt  Continues to progress well in therapy  He has no adverse effects to treatment progressions and has seen large improvement with walking and going up/down steps  Step downs are still difficult to perform softly and slowly as he steps down heavy, so fwd and lat tap downs were implemented to work on this controled transition  Tolerated overall treatment well  Patient would benefit from continued PT      Plan: Continue per plan of care  Progress treatment as tolerated  Daily Treatment Diary     DX: (L) Knee OA  EPOC: 20  Precautions: allergic to adhesives  CO-MORBIDITIES: DM II, HTN    Stage: subacute on chronic  Stability of Symptoms: stable - unchanging  Symptom Irritability Level: moderate  Primary Movement Diagnosis: poor motor control   Goal(s):   STG to be met in 2 weeks (9/10/20):  - Increase (B) Hip and Knee strength by 1/2 MMT grade  - Improve SL balance to 20 seconds  - I with HEP  LTG to be met in 4 weeks (20):  - Increase (B) Hip and Knee strength to 5/5 all planes  - Improve SL balance to 30 seconds  - I with updated HEP   - Patient will be able to return to walking greater than 1 mile    Greatest Concern: not being able to walk or get out of the car without limping  Current Activity Recommendations: added HEP () updated HEP (9/3)  Current Educational Needs: Progressions      Manual Exercise Diary  HEP 9/1 9/3 9/8 9/10 9/15    Therapeutic Exercise           Recumbent Bike  5 min 6 min 6 min 6 min 6 min              Standing Gastroc stretch  NV 15"x5 ea 30"x3 ea 30"x3 ea 30"x3 ea              SLR 8/27 5"x10 ea 1 5# x10 ea 2#  x10 ea 2 5#  10x2 ea 2 5#  10x2 ea    S/L Hip ABD 8/27 5"x10 ea 1 5# x10 ea 2#  x10 ea 2 5#  10x2 ea 2 5#  10x2 ea    PHE 8/27 5"x10 ea 1 5# x10 ea 2#  x10 ea 2 5#  10x2 ea 2 5#  10x2 ea    Bridge 8/27 5"x10  5"x10 5"x10 5"x20 5"x20              LAQ  5"x10 ea 1 5#   5"x10 ea 2#  5" x10 ea 2 5#  10x2 ea 3#  10x2 ea    Leg Press  85# x10  105# x10 105# x10  115#  x10 115#  x10  125#  x10 125#  x10  135#  x10 135#  x10  145#  x10                        Neuromuscular Reeducation          SL Balance  20"x3 ea 20"x3 ea 20"x3 20"x5 ea 20"x3 ea              TB Counter Balance  nv OTB  10x ea OTB  10x ea OTB  10x ea GTB  x10 ea              FWD Mini Lunge 9/3 10 ea 10 ea 10 ea 10 ea 10 ea    LAT Mini Lunge 9/3 10 ea 10 ea 10 ea 10 ea 10 ea    Mini Squat  5"x10 5"x10 5"x10 Foam  5"x10 Foam  5"x10              FWD Step Up  6"x10 ea 6"x10 ea 8"x10 ea      LAT Step Up  6"x10 ea 6"x10 ea 8"x10 ea      Fwd tap downs     4"x10 ea 4"x10 ea    Lat tap downs     4"x10 ea 4"x10 ea              Therapeutic Activity                              Gait Training                        Modalities

## 2020-09-17 ENCOUNTER — OFFICE VISIT (OUTPATIENT)
Dept: PHYSICAL THERAPY | Facility: CLINIC | Age: 60
End: 2020-09-17
Payer: COMMERCIAL

## 2020-09-17 DIAGNOSIS — M17.12 ARTHRITIS OF KNEE, LEFT: Primary | ICD-10-CM

## 2020-09-17 DIAGNOSIS — M25.562 LEFT KNEE PAIN, UNSPECIFIED CHRONICITY: ICD-10-CM

## 2020-09-17 PROCEDURE — 97110 THERAPEUTIC EXERCISES: CPT | Performed by: PHYSICAL THERAPIST

## 2020-09-17 PROCEDURE — 97112 NEUROMUSCULAR REEDUCATION: CPT | Performed by: PHYSICAL THERAPIST

## 2020-09-17 NOTE — PROGRESS NOTES
Daily Note     Today's date: 2020  Patient name: Cristian Washington  : 1960  MRN: [id number]  Referring provider: Ldiia Lopez MD  Dx:   Encounter Diagnosis     ICD-10-CM    1  Arthritis of knee, left  M17 12    2  Left knee pain, unspecified chronicity  M25 562                   Subjective: Patient reports good tolerance to his LV  He is seeing significant improvement in function - easier time going up and down steps  He also notes he can walk more than a mile without increased pain  Objective: See treatment diary below      Assessment:   Stage: subacute on chronic  Stability of Symptoms: evolving - improving  Symptom Irritability Level: low  Primary Movement Diagnosis: poor motor control   Goal(s):   STG to be met in 2 weeks (9/10/20):  - Increase (B) Hip and Knee strength by 1/2 MMT grade  - Improve SL balance to 20 seconds  - I with HEP  LTG to be met in 4 weeks (20):  - Increase (B) Hip and Knee strength to 5/5 all planes  - Improve SL balance to 30 seconds  - I with updated HEP   - Patient will be able to return to walking greater than 1 mile  Greatest Concern: not being able to walk or get out of the car without limping  Current Activity Recommendations: added HEP () updated HEP (9/3)  Current Educational Needs: Progressions    Patient had no complaints of increased pain throughout treatment  He struggled with neuromuscular control with step down  Corrected foot position with with heel taps  He had no complaints of increased pain with progressions made with leg press, step downs, or lunges  Patient had no complaints post treatment  Plan: Continue with POC - monitor tolerance to progressions       Daily Treatment Diary     DX: (L) Knee OA  EPOC: 20  Precautions: allergic to adhesives  CO-MORBIDITIES: DM II, HTN          Manual                                                           Exercise Diary  HEP 9/10 9/15 9/17      Therapeutic Exercise Recumbent Bike  6 min 6 min 6 min                Standing Gastroc stretch  30"x3 ea 30"x3 ea                 SLR 8/27 2 5#  10x2 ea 2 5#  10x2 ea 3#   20x ea      S/L Hip ABD 8/27 2 5#  10x2 ea 2 5#  10x2 ea 3# 20x ea      PHE 8/27 2 5#  10x2 ea 2 5#  10x2 ea 3# 20x ea      Bridge 8/27 5"x20 5"x20 5# x20                LAQ  2 5#  10x2 ea 3#  10x2 ea       Leg Press  125#  x10  135#  x10 135#  x10  145#  x10 185#  x10  205#  10x      SL Leg Press    85#  15x ea                Neuromuscular Reeducation          SL Balance  20"x5 ea 20"x3 ea 30"x3 ea                TB Counter Balance  OTB  10x ea GTB  x10 ea GTB  10x ea                FWD Mini Lunge 9/3 10 ea 10 ea 15x ea      LAT Mini Lunge 9/3 10 ea 10 ea 15x ea      Mini Squat  Foam  5"x10 Foam  5"x10 Foam  5"x10                FWD Step Up          LAT Step Up          Fwd tap downs  4"x10 ea 4"x10 ea 6" x10 ea      Lat tap downs  4"x10 ea 4"x10 ea 6"x10 ea                Therapeutic Activity                              Gait Training                        Modalities

## 2020-09-22 ENCOUNTER — OFFICE VISIT (OUTPATIENT)
Dept: PHYSICAL THERAPY | Facility: CLINIC | Age: 60
End: 2020-09-22
Payer: COMMERCIAL

## 2020-09-22 DIAGNOSIS — M17.12 ARTHRITIS OF KNEE, LEFT: Primary | ICD-10-CM

## 2020-09-22 DIAGNOSIS — M25.562 LEFT KNEE PAIN, UNSPECIFIED CHRONICITY: ICD-10-CM

## 2020-09-22 PROCEDURE — 97110 THERAPEUTIC EXERCISES: CPT

## 2020-09-22 PROCEDURE — 97112 NEUROMUSCULAR REEDUCATION: CPT

## 2020-09-22 NOTE — PROGRESS NOTES
Daily Note     Today's date: 2020  Patient name: Jeff Radford  : 1960  MRN: [id number]  Referring provider: Kaya Uriostegui MD  Dx:   Encounter Diagnosis     ICD-10-CM    1  Arthritis of knee, left  M17 12    2  Left knee pain, unspecified chronicity  M25 562                   Subjective: Patient reports he was a little sore after LV with progressions made, but did not last long  He reports continued functional progress especially noted when getting out of the car after a long car ride  Objective: See treatment diary below      Assessment:   Stage: subacute on chronic  Stability of Symptoms: evolving - improving  Symptom Irritability Level: low  Primary Movement Diagnosis: poor motor control   Goal(s):   STG to be met in 2 weeks (9/10/20):  - Increase (B) Hip and Knee strength by 1/2 MMT grade  - Improve SL balance to 20 seconds  - I with HEP  LTG to be met in 4 weeks (20):  - Increase (B) Hip and Knee strength to 5/5 all planes  - Improve SL balance to 30 seconds  - I with updated HEP   - Patient will be able to return to walking greater than 1 mile  Greatest Concern: not being able to walk or get out of the car without limping  Current Activity Recommendations: added HEP () updated HEP (9/3)  Current Educational Needs: Progressions    Patient continues to progress well  He has continued to achieve his long term goals as he progresses in therapy  He had no pain in either knee throughout session today  He had improved form today with good carryover from instructions given LV  Plan: Continue with POC - monitor tolerance to progressions       Daily Treatment Diary     DX: (L) Knee OA  EPOC: 20  Precautions: allergic to adhesives  CO-MORBIDITIES: DM II, HTN          Manual                                                           Exercise Diary  HEP 9/10 9/15 9/17 9/22     Therapeutic Exercise           Recumbent Bike  6 min 6 min 6 min 7 min               Standing Gastroc stretch  30"x3 ea 30"x3 ea                 SLR 8/27 2 5#  10x2 ea 2 5#  10x2 ea 3#   20x ea 3#   20x ea     S/L Hip ABD 8/27 2 5#  10x2 ea 2 5#  10x2 ea 3# 20x ea 3#   20x ea     PHE 8/27 2 5#  10x2 ea 2 5#  10x2 ea 3# 20x ea 3#   20x ea     Bridge 8/27 5"x20 5"x20 5# x20 W   VA  5"x20               LAQ  2 5#  10x2 ea 3#  10x2 ea  7#  x20 ea     Leg Press  125#  x10  135#  x10 135#  x10  145#  x10 185#  x10  205#  10x 205#  10x2     SL Leg Press    85#  15x ea 85#  15x ea               Neuromuscular Reeducation          SL Balance  20"x5 ea 20"x3 ea 30"x3 ea 30"x3 ea               TB Counter Balance  OTB  10x ea GTB  x10 ea GTB  10x ea GTB  10x ea               FWD Mini Lunge 9/3 10 ea 10 ea 15x ea 15x ea     LAT Mini Lunge 9/3 10 ea 10 ea 15x ea 15x ea     Mini Squat  Foam  5"x10 Foam  5"x10 Foam  5"x10 5"x20               FWD Step Up          LAT Step Up          Fwd tap downs  4"x10 ea 4"x10 ea 6" x10 ea 6" x10 ea     Lat tap downs  4"x10 ea 4"x10 ea 6"x10 ea 6" x10 ea               Therapeutic Activity                              Gait Training                        Modalities

## 2020-09-24 ENCOUNTER — EVALUATION (OUTPATIENT)
Dept: PHYSICAL THERAPY | Facility: CLINIC | Age: 60
End: 2020-09-24
Payer: COMMERCIAL

## 2020-09-24 DIAGNOSIS — M17.12 ARTHRITIS OF KNEE, LEFT: Primary | ICD-10-CM

## 2020-09-24 DIAGNOSIS — M25.562 LEFT KNEE PAIN, UNSPECIFIED CHRONICITY: ICD-10-CM

## 2020-09-24 PROCEDURE — 97110 THERAPEUTIC EXERCISES: CPT | Performed by: PHYSICAL THERAPIST

## 2020-09-24 NOTE — PROGRESS NOTES
PT Evaluation     Today's date: 2020  Patient name: Chang Schaefer  : 1960  MRN: [id number]  Referring provider: Miguel Ángel Trejo MD  Dx:   Encounter Diagnosis     ICD-10-CM    1  Arthritis of knee, left  M17 12    2  Left knee pain, unspecified chronicity  M25 562                  Assessment  Assessment details: Patient has attended 9 PT treatment sessions from 20 to 20  Since initial evaluation patient displays with objective improvements with pain levels, strength, ROM, and function  Patient has made excellent progress with PT  He has met his functional goals and is I with HEP  At this time it is recommended that he continue with an I HEP  He is to contact me if he has return of symptoms or any questions/ concerns  Please contact me if you have any questions  Thank you for the opportunity to share in the care of this patient         Impairments: abnormal gait, abnormal muscle tone, abnormal or restricted ROM, abnormal movement, activity intolerance, impaired balance, impaired physical strength, lacks appropriate home exercise program, pain with function and poor body mechanics    Symptom irritability: moderateUnderstanding of Dx/Px/POC: good   Prognosis: good  Prognosis details: Positive prognostic indicators include positive attitude toward recovery, motivated to improve, high self-efficacy, good understanding of condition  Negative prognostic indicators include chronicity of symptoms  Goals  STG to be met in 2 weeks (9/10/20):  - Increase (B) Hip and Knee strength by 1/2 MMT grade  - met  - Improve SL balance to 20 seconds  - met  - I with HEP  - met  LTG to be met in 4 weeks (20):  - Increase (B) Hip and Knee strength to 5/5 all planes  - met  - Improve SL balance to 30 seconds  - I with updated HEP  - met  - Patient will be able to return to walking greater than 1 mile  - met  - Patient will be able to negotiate a full flight of steps without increased pain   - met      Plan  Plan details: DC to I HEP    Patient would benefit from: skilled physical therapy  Planned modality interventions: cryotherapy and thermotherapy: hydrocollator packs  Planned therapy interventions: joint mobilization, manual therapy, neuromuscular re-education, patient education, strengthening, stretching, therapeutic activities, therapeutic exercise, home exercise program, body mechanics training, activity modification, functional ROM exercises, gait training and balance  Treatment plan discussed with: patient        Subjective Evaluation    History of Present Illness  Mechanism of injury: At Evaluation (20): Patient reports chronic knee pain for many years  He reports being treated with cortisone injections  He notes about a month ago he had increase in (L) knee pain and difficulty weightbearing  He notes recently changing his activity - now driving a lot and using steps daily  He also notes having a lawn that he has to push mow  He saw an orthopedic specialist - x-rays and cortisone injection were performed  PT was recommended  He also notes having a chiropractic treatment - laser treatment which was helpful  Red Flag Screen:  Patient denies recent fever, changes in bowel and bladder function, nausea and vomiting, weakness, unexplained weight loss, tingling, numbness, pain with coughing, or traumatic VALENTIN   Patient reports overuse injury       Greatest concern: not being able to walk or get out of the car without limping    (20): Patient reports since starting PT he is feeling much better  He sees significant improvement in his pain levels, strength, ROM, and function  He notes particular improvement with walking, stair negotiation, and function with yard work  He is performing his HEP regularly        Quality of life: good    Pain  Current pain ratin  At best pain ratin  At worst pain ratin  Location: (L) > (R) Knee  Quality: throbbing, sharp and dull ache    Social Support  Steps to enter house: yes  Stairs in house: yes   Lives in: multiple-level home  Lives with: spouse    Employment status: working ([employer] officer )    Diagnostic Tests  X-ray: abnormal  Treatments  Previous treatment: injection treatment and chiropractic  Patient Goals  Patient goal: Return to getting in and out of the car without difficulty - met, return to walking a mile without increased pain - met, negotiate steps without difficulty - met        Objective     Static Posture   General Observations  Symmetrical weight bearing  Palpation     Additional Palpation Details  TTP (B) medial joint line, pes anserine region     Active Range of Motion   Left Knee   Flexion: 135 degrees   Extension: 0 degrees     Right Knee   Flexion: 135 degrees   Extension: 0 degrees     Additional Active Range of Motion Details  Pain with flexion OP (B); pain with extension OP (L)    Strength/Myotome Testing     Left Hip   Planes of Motion   Extension: 5  Abduction: 5    Right Hip   Planes of Motion   Extension: 5  Abduction: 5    Left Knee   Flexion: 5  Extension: 5    Right Knee   Flexion: 5  Extension: 5    Ambulation     Observational Gait   Walking speed, left stance time, left step length and right step length within functional limits     Left foot contact pattern: heel to toe  Right foot contact pattern: heel to toe  Base of support: increased    Functional Assessment        Comments  Sit to stand: equal WB - no UE use   Squat: good form and depth   FWD Lunge: improved balance (B)   Step Up: good form (B)  SL Balance: (R) > 30 sec - ankle strategy; (L) 20 sec - ankle strategy               Daily Treatment Diary     DX: (L) Knee OA  EPOC: 9/24/20  Precautions: allergic to adhesives  CO-MORBIDITIES: DM II, HTN    Stage: subacute on chronic  Stability of Symptoms: evolving - improving  Symptom Irritability Level: low  Primary Movement Diagnosis: poor motor control   Goal(s):   STG to be met in 2 weeks (9/10/20):  - Increase (B) Hip and Knee strength by 1/2 MMT grade  - Improve SL balance to 20 seconds  - I with HEP  LTG to be met in 4 weeks (9/24/20):  - Increase (B) Hip and Knee strength to 5/5 all planes  - Improve SL balance to 30 seconds  - I with updated HEP   - Patient will be able to return to walking greater than 1 mile  Greatest Concern: not being able to walk or get out of the car without limping  Current Activity Recommendations: added HEP (8/27)  Current Educational Needs: Progressions        Manual                                                           Exercise Diary  HEP 9/10 9/15 9/17 9/22 9/24    Therapeutic Exercise           Recumbent Bike  6 min 6 min 6 min 7 min 6 min              Standing Gastroc stretch  30"x3 ea 30"x3 ea                 SLR 8/27 2 5#  10x2 ea 2 5#  10x2 ea 3#   20x ea 3#   20x ea     S/L Hip ABD 8/27 2 5#  10x2 ea 2 5#  10x2 ea 3# 20x ea 3#   20x ea     PHE 8/27 2 5#  10x2 ea 2 5#  10x2 ea 3# 20x ea 3#   20x ea     Bridge 8/27 5"x20 5"x20 5# x20 W   OH  5"x20               LAQ  2 5#  10x2 ea 3#  10x2 ea  7#  x20 ea     Leg Press  125#  x10  135#  x10 135#  x10  145#  x10 185#  x10  205#  10x 205#  10x2     SL Leg Press    85#  15x ea 85#  15x ea               Neuromuscular Reeducation          SL Balance  20"x5 ea 20"x3 ea 30"x3 ea 30"x3 ea               TB Counter Balance  OTB  10x ea GTB  x10 ea GTB  10x ea GTB  10x ea               FWD Mini Lunge 9/3 10 ea 10 ea 15x ea 15x ea     LAT Mini Lunge 9/3 10 ea 10 ea 15x ea 15x ea     Mini Squat  Foam  5"x10 Foam  5"x10 Foam  5"x10 5"x20               FWD Step Up          LAT Step Up          Fwd tap downs  4"x10 ea 4"x10 ea 6" x10 ea 6" x10 ea     Lat tap downs  4"x10 ea 4"x10 ea 6"x10 ea 6" x10 ea               Therapeutic Activity                              Gait Training                        Modalities

## 2020-11-20 VITALS
BODY MASS INDEX: 36.3 KG/M2 | DIASTOLIC BLOOD PRESSURE: 70 MMHG | HEIGHT: 72 IN | WEIGHT: 268 LBS | SYSTOLIC BLOOD PRESSURE: 122 MMHG | TEMPERATURE: 97.7 F

## 2020-11-20 DIAGNOSIS — M17.12 ARTHRITIS OF LEFT KNEE: Primary | ICD-10-CM

## 2020-11-20 PROCEDURE — 99212 OFFICE O/P EST SF 10 MIN: CPT | Performed by: ORTHOPAEDIC SURGERY

## (undated) DEVICE — ABSORBABLE WOUND CLOSURE DEVICE: Brand: V-LOC 90

## (undated) DEVICE — TROCAR: Brand: KII® SLEEVE

## (undated) DEVICE — SPONGE 4 X 4 XRAY 16 PLY STRL LF RFD

## (undated) DEVICE — ENDOPATH 5MM CURVED SCISSORS WITH MONOPOLAR CAUTERY: Brand: ENDOPATH

## (undated) DEVICE — ENDOPATH 5MM ENDOSCOPIC BLUNT TIP DISSECTORS (12 POUCHES CONTAINING 3 DISSECTORS EACH): Brand: ENDOPATH

## (undated) DEVICE — GLOVE SRG BIOGEL ECLIPSE 8

## (undated) DEVICE — INTENDED FOR TISSUE SEPARATION, AND OTHER PROCEDURES THAT REQUIRE A SHARP SURGICAL BLADE TO PUNCTURE OR CUT.: Brand: BARD-PARKER SAFETY BLADES SIZE 15, STERILE

## (undated) DEVICE — PAD GROUNDING ADULT

## (undated) DEVICE — ALLENTOWN LAP CHOLE APP PACK: Brand: CARDINAL HEALTH

## (undated) DEVICE — GLOVE SRG BIOGEL 6.5

## (undated) DEVICE — TROCAR: Brand: KII FIOS FIRST ENTRY

## (undated) DEVICE — HARMONIC DISPOSABLE HAND SWITCHING ADAPTORS: Brand: HARMONIC

## (undated) DEVICE — TRAY FOLEY 16FR URIMETER SURESTEP

## (undated) DEVICE — SUT PROLENE 2-0 CT-2 30 IN 8411H

## (undated) DEVICE — 2000CC GUARDIAN II: Brand: GUARDIAN

## (undated) DEVICE — GLOVE INDICATOR PI UNDERGLOVE SZ 8 BLUE

## (undated) DEVICE — SUT VICRYL 0 UR-6 27 IN J603H

## (undated) DEVICE — BLUE HEAT SCOPE WARMER

## (undated) DEVICE — HARMONIC HOOK 5 MM HDH05D

## (undated) DEVICE — TROCARS: Brand: KII® BALLOON BLUNT TIP SYSTEM

## (undated) DEVICE — ADHESIVE SKN CLSR HISTOACRYL FLEX 0.5ML LF

## (undated) DEVICE — MEDI-VAC YANKAUER SUCTION HANDLE W/BULBOUS AND CONTROL VENT: Brand: CARDINAL HEALTH

## (undated) DEVICE — GLOVE INDICATOR PI UNDERGLOVE SZ 6.5 BLUE

## (undated) DEVICE — NEEDLE 25G X 1 1/2

## (undated) DEVICE — ELECTRODE BLADE MOD E-Z CLEAN 2.5IN 6.4CM -0012M

## (undated) DEVICE — CHLORAPREP HI-LITE 26ML ORANGE

## (undated) DEVICE — SUT VICRYL 3-0 SH 27 IN J416H

## (undated) DEVICE — BETHLEHEM UNIVERSAL MINOR GEN: Brand: CARDINAL HEALTH

## (undated) DEVICE — SURGICAL CLIPPER BLADE GENERAL USE

## (undated) DEVICE — SYRINGE CATH TIP 50ML

## (undated) DEVICE — INTENDED FOR TISSUE SEPARATION, AND OTHER PROCEDURES THAT REQUIRE A SHARP SURGICAL BLADE TO PUNCTURE OR CUT.: Brand: BARD-PARKER SAFETY BLADES SIZE 11, STERILE

## (undated) DEVICE — SUT MONOCRYL 4-0 PS-2 27 IN Y426H

## (undated) DEVICE — TUBING SUCTION 5MM X 12 FT

## (undated) DEVICE — STAPLER ENDO HERNIA 4.0 X 12MM

## (undated) DEVICE — DRAPE EQUIPMENT RF WAND

## (undated) DEVICE — SCD SEQUENTIAL COMPRESSION COMFORT SLEEVE MEDIUM KNEE LENGTH: Brand: KENDALL SCD

## (undated) DEVICE — VIAL DECANTER